# Patient Record
Sex: MALE | Race: WHITE | Employment: FULL TIME | URBAN - METROPOLITAN AREA
[De-identification: names, ages, dates, MRNs, and addresses within clinical notes are randomized per-mention and may not be internally consistent; named-entity substitution may affect disease eponyms.]

---

## 2017-09-19 ENCOUNTER — OFFICE VISIT (OUTPATIENT)
Dept: FAMILY MEDICINE CLINIC | Facility: CLINIC | Age: 52
End: 2017-09-19

## 2017-09-19 VITALS
WEIGHT: 177 LBS | BODY MASS INDEX: 25.34 KG/M2 | HEIGHT: 70 IN | SYSTOLIC BLOOD PRESSURE: 120 MMHG | OXYGEN SATURATION: 98 % | RESPIRATION RATE: 18 BRPM | TEMPERATURE: 98 F | DIASTOLIC BLOOD PRESSURE: 84 MMHG | HEART RATE: 76 BPM

## 2017-09-19 DIAGNOSIS — L82.1 SEBORRHEIC KERATOSIS: Primary | ICD-10-CM

## 2017-09-19 PROCEDURE — 99213 OFFICE O/P EST LOW 20 MIN: CPT | Performed by: FAMILY MEDICINE

## 2017-09-19 NOTE — PROGRESS NOTES
Here with wife for a slightly enlarged lesion left upper outer buttock no family history of skin cancers he himself has had no significant history. The lesion is asymptomatic measures three quarters of a centimeter lengthwise.   There is no local lymphaden

## 2021-08-05 ENCOUNTER — APPOINTMENT (OUTPATIENT)
Dept: CV DIAGNOSTICS | Facility: HOSPITAL | Age: 56
End: 2021-08-05
Attending: INTERNAL MEDICINE
Payer: COMMERCIAL

## 2021-08-05 ENCOUNTER — HOSPITAL ENCOUNTER (OUTPATIENT)
Facility: HOSPITAL | Age: 56
Setting detail: OBSERVATION
Discharge: HOME | End: 2021-08-06
Attending: EMERGENCY MEDICINE | Admitting: INTERNAL MEDICINE
Payer: COMMERCIAL

## 2021-08-05 ENCOUNTER — APPOINTMENT (OUTPATIENT)
Dept: CT IMAGING | Facility: HOSPITAL | Age: 56
End: 2021-08-05
Attending: EMERGENCY MEDICINE
Payer: COMMERCIAL

## 2021-08-05 ENCOUNTER — APPOINTMENT (OUTPATIENT)
Dept: INTERVENTIONAL RADIOLOGY/VASCULAR | Facility: HOSPITAL | Age: 56
End: 2021-08-05
Attending: INTERNAL MEDICINE
Payer: COMMERCIAL

## 2021-08-05 ENCOUNTER — APPOINTMENT (OUTPATIENT)
Dept: GENERAL RADIOLOGY | Facility: HOSPITAL | Age: 56
End: 2021-08-05
Attending: EMERGENCY MEDICINE
Payer: COMMERCIAL

## 2021-08-05 DIAGNOSIS — R07.9 ACUTE CHEST PAIN: Primary | ICD-10-CM

## 2021-08-05 LAB
ALBUMIN SERPL-MCNC: 3.6 G/DL (ref 3.4–5)
ALBUMIN/GLOB SERPL: 1 {RATIO} (ref 1–2)
ALP LIVER SERPL-CCNC: 89 U/L
ALT SERPL-CCNC: 28 U/L
ANION GAP SERPL CALC-SCNC: 3 MMOL/L (ref 0–18)
ANION GAP SERPL CALC-SCNC: 4 MMOL/L (ref 0–18)
APTT PPP: 44.5 SECONDS (ref 25.4–36.1)
AST SERPL-CCNC: 25 U/L (ref 15–37)
ATRIAL RATE: 56 BPM
ATRIAL RATE: 58 BPM
BASOPHILS # BLD AUTO: 0.05 X10(3) UL (ref 0–0.2)
BASOPHILS NFR BLD AUTO: 0.9 %
BILIRUB SERPL-MCNC: 0.8 MG/DL (ref 0.1–2)
BUN BLD-MCNC: 10 MG/DL (ref 7–18)
BUN BLD-MCNC: 13 MG/DL (ref 7–18)
CALCIUM BLD-MCNC: 8.2 MG/DL (ref 8.5–10.1)
CALCIUM BLD-MCNC: 8.7 MG/DL (ref 8.5–10.1)
CHLORIDE SERPL-SCNC: 108 MMOL/L (ref 98–112)
CHLORIDE SERPL-SCNC: 110 MMOL/L (ref 98–112)
CO2 SERPL-SCNC: 27 MMOL/L (ref 21–32)
CO2 SERPL-SCNC: 27 MMOL/L (ref 21–32)
CREAT BLD-MCNC: 0.83 MG/DL
CREAT BLD-MCNC: 1.15 MG/DL
D-DIMER: 0.52 UG/ML FEU (ref ?–0.55)
EOSINOPHIL # BLD AUTO: 0.24 X10(3) UL (ref 0–0.7)
EOSINOPHIL NFR BLD AUTO: 4.2 %
ERYTHROCYTE [DISTWIDTH] IN BLOOD BY AUTOMATED COUNT: 12.2 %
ERYTHROCYTE [DISTWIDTH] IN BLOOD BY AUTOMATED COUNT: 12.4 %
GLOBULIN PLAS-MCNC: 3.6 G/DL (ref 2.8–4.4)
GLUCOSE BLD-MCNC: 112 MG/DL (ref 70–99)
GLUCOSE BLD-MCNC: 99 MG/DL (ref 70–99)
HCT VFR BLD AUTO: 46 %
HCT VFR BLD AUTO: 52.4 %
HGB BLD-MCNC: 15.4 G/DL
HGB BLD-MCNC: 17 G/DL
IMM GRANULOCYTES # BLD AUTO: 0.01 X10(3) UL (ref 0–1)
IMM GRANULOCYTES NFR BLD: 0.2 %
LIPASE SERPL-CCNC: 758 U/L (ref 73–393)
LYMPHOCYTES # BLD AUTO: 2.69 X10(3) UL (ref 1–4)
LYMPHOCYTES NFR BLD AUTO: 47.5 %
M PROTEIN MFR SERPL ELPH: 7.2 G/DL (ref 6.4–8.2)
MCH RBC QN AUTO: 30.5 PG (ref 26–34)
MCH RBC QN AUTO: 30.9 PG (ref 26–34)
MCHC RBC AUTO-ENTMCNC: 32.4 G/DL (ref 31–37)
MCHC RBC AUTO-ENTMCNC: 33.5 G/DL (ref 31–37)
MCV RBC AUTO: 92.2 FL
MCV RBC AUTO: 93.9 FL
MONOCYTES # BLD AUTO: 0.54 X10(3) UL (ref 0.1–1)
MONOCYTES NFR BLD AUTO: 9.5 %
NEUTROPHILS # BLD AUTO: 2.13 X10 (3) UL (ref 1.5–7.7)
NEUTROPHILS # BLD AUTO: 2.13 X10(3) UL (ref 1.5–7.7)
NEUTROPHILS NFR BLD AUTO: 37.7 %
NT-PROBNP SERPL-MCNC: 61 PG/ML (ref ?–125)
OSMOLALITY SERPL CALC.SUM OF ELEC: 288 MOSM/KG (ref 275–295)
OSMOLALITY SERPL CALC.SUM OF ELEC: 290 MOSM/KG (ref 275–295)
P AXIS: 18 DEGREES
P AXIS: 39 DEGREES
P-R INTERVAL: 122 MS
P-R INTERVAL: 130 MS
PLATELET # BLD AUTO: 243 10(3)UL (ref 150–450)
PLATELET # BLD AUTO: 279 10(3)UL (ref 150–450)
POTASSIUM SERPL-SCNC: 3.7 MMOL/L (ref 3.5–5.1)
POTASSIUM SERPL-SCNC: 4 MMOL/L (ref 3.5–5.1)
PSA SERPL-MCNC: 0.48 NG/ML (ref ?–4)
Q-T INTERVAL: 404 MS
Q-T INTERVAL: 416 MS
QRS DURATION: 84 MS
QRS DURATION: 92 MS
QTC CALCULATION (BEZET): 396 MS
QTC CALCULATION (BEZET): 401 MS
R AXIS: 62 DEGREES
R AXIS: 65 DEGREES
RBC # BLD AUTO: 4.99 X10(6)UL
RBC # BLD AUTO: 5.58 X10(6)UL
SARS-COV-2 RNA RESP QL NAA+PROBE: NOT DETECTED
SODIUM SERPL-SCNC: 139 MMOL/L (ref 136–145)
SODIUM SERPL-SCNC: 140 MMOL/L (ref 136–145)
T AXIS: 47 DEGREES
T AXIS: 50 DEGREES
TROPONIN I SERPL-MCNC: 12.9 NG/ML (ref ?–0.04)
TROPONIN I SERPL-MCNC: 21.8 NG/ML (ref ?–0.04)
TROPONIN I SERPL-MCNC: <0.045 NG/ML (ref ?–0.04)
VENTRICULAR RATE: 56 BPM
VENTRICULAR RATE: 58 BPM
WBC # BLD AUTO: 5.7 X10(3) UL (ref 4–11)
WBC # BLD AUTO: 5.9 X10(3) UL (ref 4–11)

## 2021-08-05 PROCEDURE — 99285 EMERGENCY DEPT VISIT HI MDM: CPT

## 2021-08-05 PROCEDURE — 80053 COMPREHEN METABOLIC PANEL: CPT | Performed by: EMERGENCY MEDICINE

## 2021-08-05 PROCEDURE — B2111ZZ FLUOROSCOPY OF MULTIPLE CORONARY ARTERIES USING LOW OSMOLAR CONTRAST: ICD-10-PCS | Performed by: INTERNAL MEDICINE

## 2021-08-05 PROCEDURE — 85379 FIBRIN DEGRADATION QUANT: CPT | Performed by: EMERGENCY MEDICINE

## 2021-08-05 PROCEDURE — 4A023N7 MEASUREMENT OF CARDIAC SAMPLING AND PRESSURE, LEFT HEART, PERCUTANEOUS APPROACH: ICD-10-PCS | Performed by: INTERNAL MEDICINE

## 2021-08-05 PROCEDURE — 93010 ELECTROCARDIOGRAM REPORT: CPT

## 2021-08-05 PROCEDURE — 36415 COLL VENOUS BLD VENIPUNCTURE: CPT

## 2021-08-05 PROCEDURE — 86334 IMMUNOFIX E-PHORESIS SERUM: CPT | Performed by: HOSPITALIST

## 2021-08-05 PROCEDURE — 85730 THROMBOPLASTIN TIME PARTIAL: CPT | Performed by: INTERNAL MEDICINE

## 2021-08-05 PROCEDURE — 93306 TTE W/DOPPLER COMPLETE: CPT | Performed by: INTERNAL MEDICINE

## 2021-08-05 PROCEDURE — 84165 PROTEIN E-PHORESIS SERUM: CPT | Performed by: HOSPITALIST

## 2021-08-05 PROCEDURE — B2151ZZ FLUOROSCOPY OF LEFT HEART USING LOW OSMOLAR CONTRAST: ICD-10-PCS | Performed by: INTERNAL MEDICINE

## 2021-08-05 PROCEDURE — 85027 COMPLETE CBC AUTOMATED: CPT | Performed by: INTERNAL MEDICINE

## 2021-08-05 PROCEDURE — 93010 ELECTROCARDIOGRAM REPORT: CPT | Performed by: INTERNAL MEDICINE

## 2021-08-05 PROCEDURE — 71275 CT ANGIOGRAPHY CHEST: CPT | Performed by: EMERGENCY MEDICINE

## 2021-08-05 PROCEDURE — 84484 ASSAY OF TROPONIN QUANT: CPT | Performed by: NURSE PRACTITIONER

## 2021-08-05 PROCEDURE — 99152 MOD SED SAME PHYS/QHP 5/>YRS: CPT

## 2021-08-05 PROCEDURE — 84484 ASSAY OF TROPONIN QUANT: CPT | Performed by: EMERGENCY MEDICINE

## 2021-08-05 PROCEDURE — 83690 ASSAY OF LIPASE: CPT | Performed by: EMERGENCY MEDICINE

## 2021-08-05 PROCEDURE — 83883 ASSAY NEPHELOMETRY NOT SPEC: CPT | Performed by: HOSPITALIST

## 2021-08-05 PROCEDURE — 84153 ASSAY OF PSA TOTAL: CPT | Performed by: HOSPITALIST

## 2021-08-05 PROCEDURE — 93458 L HRT ARTERY/VENTRICLE ANGIO: CPT

## 2021-08-05 PROCEDURE — 93005 ELECTROCARDIOGRAM TRACING: CPT

## 2021-08-05 PROCEDURE — 71045 X-RAY EXAM CHEST 1 VIEW: CPT | Performed by: EMERGENCY MEDICINE

## 2021-08-05 PROCEDURE — 74177 CT ABD & PELVIS W/CONTRAST: CPT | Performed by: EMERGENCY MEDICINE

## 2021-08-05 PROCEDURE — 85025 COMPLETE CBC W/AUTO DIFF WBC: CPT | Performed by: EMERGENCY MEDICINE

## 2021-08-05 PROCEDURE — 83880 ASSAY OF NATRIURETIC PEPTIDE: CPT | Performed by: EMERGENCY MEDICINE

## 2021-08-05 PROCEDURE — 99153 MOD SED SAME PHYS/QHP EA: CPT

## 2021-08-05 PROCEDURE — 84484 ASSAY OF TROPONIN QUANT: CPT | Performed by: HOSPITALIST

## 2021-08-05 RX ORDER — HEPARIN SODIUM AND DEXTROSE 10000; 5 [USP'U]/100ML; G/100ML
12 INJECTION INTRAVENOUS ONCE
Status: COMPLETED | OUTPATIENT
Start: 2021-08-05 | End: 2021-08-06

## 2021-08-05 RX ORDER — MIDAZOLAM HYDROCHLORIDE 1 MG/ML
INJECTION INTRAMUSCULAR; INTRAVENOUS
Status: COMPLETED
Start: 2021-08-05 | End: 2021-08-05

## 2021-08-05 RX ORDER — ONDANSETRON 2 MG/ML
4 INJECTION INTRAMUSCULAR; INTRAVENOUS EVERY 6 HOURS PRN
Status: DISCONTINUED | OUTPATIENT
Start: 2021-08-05 | End: 2021-08-06

## 2021-08-05 RX ORDER — NITROGLYCERIN 0.4 MG/1
0.4 TABLET SUBLINGUAL EVERY 5 MIN PRN
Status: DISCONTINUED | OUTPATIENT
Start: 2021-08-05 | End: 2021-08-06

## 2021-08-05 RX ORDER — HEPARIN SODIUM 5000 [USP'U]/ML
60 INJECTION INTRAVENOUS; SUBCUTANEOUS ONCE
Status: COMPLETED | OUTPATIENT
Start: 2021-08-05 | End: 2021-08-05

## 2021-08-05 RX ORDER — HEPARIN SODIUM AND DEXTROSE 10000; 5 [USP'U]/100ML; G/100ML
INJECTION INTRAVENOUS CONTINUOUS
Status: DISCONTINUED | OUTPATIENT
Start: 2021-08-05 | End: 2021-08-06

## 2021-08-05 RX ORDER — MAGNESIUM HYDROXIDE/ALUMINUM HYDROXICE/SIMETHICONE 120; 1200; 1200 MG/30ML; MG/30ML; MG/30ML
30 SUSPENSION ORAL 4 TIMES DAILY PRN
Status: DISCONTINUED | OUTPATIENT
Start: 2021-08-05 | End: 2021-08-06

## 2021-08-05 RX ORDER — PANTOPRAZOLE SODIUM 40 MG/1
40 TABLET, DELAYED RELEASE ORAL
Status: DISCONTINUED | OUTPATIENT
Start: 2021-08-06 | End: 2021-08-06

## 2021-08-05 RX ORDER — VERAPAMIL HYDROCHLORIDE 2.5 MG/ML
INJECTION, SOLUTION INTRAVENOUS
Status: COMPLETED
Start: 2021-08-05 | End: 2021-08-05

## 2021-08-05 RX ORDER — PANTOPRAZOLE SODIUM 40 MG/1
40 TABLET, DELAYED RELEASE ORAL DAILY
Qty: 30 TABLET | Refills: 0 | Status: SHIPPED | OUTPATIENT
Start: 2021-08-05 | End: 2021-08-06

## 2021-08-05 RX ORDER — NITROGLYCERIN 20 MG/100ML
INJECTION INTRAVENOUS
Status: COMPLETED
Start: 2021-08-05 | End: 2021-08-05

## 2021-08-05 RX ORDER — CALCIUM CARBONATE 200(500)MG
TABLET,CHEWABLE ORAL 3 TIMES DAILY PRN
Status: DISCONTINUED | OUTPATIENT
Start: 2021-08-05 | End: 2021-08-06

## 2021-08-05 RX ORDER — METOPROLOL SUCCINATE 50 MG/1
50 TABLET, EXTENDED RELEASE ORAL
Status: COMPLETED | OUTPATIENT
Start: 2021-08-05 | End: 2021-08-05

## 2021-08-05 RX ORDER — ASPIRIN 81 MG/1
324 TABLET, CHEWABLE ORAL ONCE
Status: COMPLETED | OUTPATIENT
Start: 2021-08-05 | End: 2021-08-05

## 2021-08-05 RX ORDER — SODIUM CHLORIDE 9 MG/ML
INJECTION, SOLUTION INTRAVENOUS CONTINUOUS
Status: DISCONTINUED | OUTPATIENT
Start: 2021-08-05 | End: 2021-08-06

## 2021-08-05 RX ORDER — LIDOCAINE HYDROCHLORIDE 10 MG/ML
INJECTION, SOLUTION EPIDURAL; INFILTRATION; INTRACAUDAL; PERINEURAL
Status: COMPLETED
Start: 2021-08-05 | End: 2021-08-05

## 2021-08-05 RX ORDER — HEPARIN SODIUM 5000 [USP'U]/ML
INJECTION, SOLUTION INTRAVENOUS; SUBCUTANEOUS
Status: COMPLETED
Start: 2021-08-05 | End: 2021-08-05

## 2021-08-05 NOTE — H&P
REG Hospitalist H&P       CC: Patient presents with:  Chest Pain Angina       PCP: Anika Lopez MD    History of Present Illness:  Patient is a 54year old male with PMH sig for GERD presented with CP/pressure with radiation up to jaw.  At time of my 1. 15   CA 8.7   GLU 99       Recent Labs   Lab 08/05/21  0651   ALT 28   AST 25   ALB 3.6       Recent Labs   Lab 08/05/21  0651   TROP <0.045   PBNP 61       Recent Labs   Lab 08/05/21  0713   DDIMER 0.52       Radiology: XR CHEST AP PORTABLE  (CPT=71023) ACCESS/CATHETER PLACEMENT:   The right radial area was prepped and draped in a sterile manner and anesthetized with 2% lidocaine. The right radial artery was accessed, and a 6-Vincentian, 11 cm sheath was placed.   Left and right selective coronary angiography LCx: mild luminal irregularities - RCA: no significant angiographic disease  RECOMMENDATIONS: No angiographic indication for coronary revascularization. Non-cardiac chest pain. No further cardiac testing recommendations.      CTA CHEST + CT ABD (W) + CT P acute process. No excessive stool in the colon, sign of colitis, diverticulitis, bowel obstruction, free air, ascites, mesenteric adenopathy, mesenteric edema. ABDOMINAL WALL:  Unremarkable. URINARY BLADDER:  Unremarkable. PELVIC NODES:  Unremarkable. wnl  - PSA added on - wnl  - SPEP sent   - heme/onc consultation appreciated     Prophy: SCDs    Dispo: admit, going to cath lab    Niels Kayser, MD  [facility] IM Hospitalist  Pager: [phone]      ADDENDUM  -s/p cath - no signficant CAD noted. EF ok.  Ok to d

## 2021-08-05 NOTE — ED QUICK NOTES
Raegan Araujo ([id number]) listed as RN on CTU 2. Transport paged  Did not receive metoprolol succ from pharmacy at this time.

## 2021-08-05 NOTE — ED QUICK NOTES
Called pharmacy to send Metoprolol to A pod tube station    Called CTU2 Charge RN, no nurse name or number listed.

## 2021-08-05 NOTE — PROCEDURES
Benita Salazar Location: Cath Lab    [facility] [id number] MRN [id number]   Admission Date 8/5/2021 Procedure Date 8/5/2021   Attending Physician Silver Sharma MD Procedure Physician Jaxson Farley MD         CARDIAC CATHETERIZATION pullback of the catheter. 2.  Selective coronary angiography:      Left main artery: The left main artery is a medium caliber, bifurcating vessel without significant angiographic disease.       LAD:  The left anterior descending artery is a medium size v

## 2021-08-05 NOTE — PROGRESS NOTES
Pt return from cath lab, Right wrist T-R band in place 10cc of air, no bleeding or hematoma noted, pt BP in the upper 80's, cardiology aware, orders to bolus pt,     This RN was notified of critical troponin 12.9, cardiology notified.  Order to have troponi

## 2021-08-05 NOTE — ED INITIAL ASSESSMENT (HPI)
Patient here with c/o chest pain that started this morning while lying awake in bed. Pain goes across chest and radiates up neck, aching/stabbing in nature 6/10. Denies SOB.

## 2021-08-05 NOTE — CONSULTS
[facility] Cardiology  Consultation Note      Deette Suly Patient Status:  Emergency    10/28/1965 MRN [id number]   Location [address] [facility] Attending Delmer Haque, [facility] Day # 0 PCP Kourtney Hardy MD     Reason standpoint.     **ADDENDUM**  - Repeat trop 12.9, #2 repeat 21.    - Re-reviewed angiogram films to confirm absence of coronary plaque rupture.  - DDx includes marlene-pericardial process (but no ECG changes?), coronary vasospasm  - TTE, cMRI in AM  - heparin g for myalgias  Neurological: negative for dizziness and headaches  Endocrine: negative for temperature intolerance      Physical Exam:  Blood pressure 129/90, pulse 62, temperature 97.9 °F (36.6 °C), temperature source Temporal, resp.  rate 16, height 5' 10\ questions.     Juliet Thompson MD  8/5/2021  10:41 AM

## 2021-08-05 NOTE — ED QUICK NOTES
Orders for admission, patient is aware of plan and ready to go upstairs. Any questions, please call ED RN [phone]    Vaccinated?   Type of COVID test sent: rapid, negative  COVID Suspicion level:       Titratable drug(s) infusing:na  Rate:    LOC at time of t

## 2021-08-05 NOTE — PLAN OF CARE
Pt is alert x 4, on Ra, NSR on the monitor. Pt reports chest pressure 4/10 on arrival, Pt is up with SBA, continent of B/B. All needs met and will continue to monitor. PLAN; Cath today.        POC: Discussed and updated with pt, pt verbalized Marguerite Rossi ordered  - Initiate emergency measures for life threatening arrhythmias  - Monitor electrolytes and administer replacement therapy as ordered  Outcome: Progressing

## 2021-08-05 NOTE — ED PROVIDER NOTES
Patient Seen in: [facility] Emergency Department      History   Patient presents with:  Chest Pain Angina    Stated Complaint: Chest pain    HPI/Subjective:   HPI    14-year-old male presents emergency department with chief complaint of chest discom 98%   BMI 23.68 kg/m²         Physical Exam    GENERAL: Patient is awake, alert, well-appearing, in no acute distress. HEENT:  no scleral icterus. Mucous membranes are moist.  Scalp is atraumatic. NECK: Neck is supple, there is no nuchal rigidity.     HE angina/heart disease, pulmonary embolism, dissection, pneumothorax, pneumonia, pancreatitis, cholecystitis/cholelithiasis, and other  MDM      Patient IV established, placed on cardiac monitor and pulse ox. Patient was given aspirin.   Chest x-ray performe

## 2021-08-06 ENCOUNTER — APPOINTMENT (OUTPATIENT)
Dept: MRI IMAGING | Facility: HOSPITAL | Age: 56
End: 2021-08-06
Attending: INTERNAL MEDICINE
Payer: COMMERCIAL

## 2021-08-06 VITALS
HEIGHT: 70 IN | DIASTOLIC BLOOD PRESSURE: 72 MMHG | TEMPERATURE: 99 F | WEIGHT: 162.5 LBS | BODY MASS INDEX: 23.26 KG/M2 | SYSTOLIC BLOOD PRESSURE: 109 MMHG | RESPIRATION RATE: 18 BRPM | HEART RATE: 56 BPM | OXYGEN SATURATION: 96 %

## 2021-08-06 LAB
ANION GAP SERPL CALC-SCNC: 3 MMOL/L (ref 0–18)
APTT PPP: 111.9 SECONDS (ref 25.4–36.1)
APTT PPP: 51.1 SECONDS (ref 25.4–36.1)
ATRIAL RATE: 61 BPM
BASOPHILS # BLD AUTO: 0.05 X10(3) UL (ref 0–0.2)
BASOPHILS NFR BLD AUTO: 0.8 %
BUN BLD-MCNC: 10 MG/DL (ref 7–18)
CALCIUM BLD-MCNC: 8.2 MG/DL (ref 8.5–10.1)
CHLORIDE SERPL-SCNC: 112 MMOL/L (ref 98–112)
CO2 SERPL-SCNC: 26 MMOL/L (ref 21–32)
CREAT BLD-MCNC: 0.98 MG/DL
EOSINOPHIL # BLD AUTO: 0.21 X10(3) UL (ref 0–0.7)
EOSINOPHIL NFR BLD AUTO: 3.4 %
ERYTHROCYTE [DISTWIDTH] IN BLOOD BY AUTOMATED COUNT: 12.6 %
GLUCOSE BLD-MCNC: 90 MG/DL (ref 70–99)
HCT VFR BLD AUTO: 44.5 %
HGB BLD-MCNC: 14.9 G/DL
IMM GRANULOCYTES # BLD AUTO: 0.01 X10(3) UL (ref 0–1)
IMM GRANULOCYTES NFR BLD: 0.2 %
LIPASE SERPL-CCNC: 237 U/L (ref 73–393)
LYMPHOCYTES # BLD AUTO: 1.99 X10(3) UL (ref 1–4)
LYMPHOCYTES NFR BLD AUTO: 32.6 %
MCH RBC QN AUTO: 30.7 PG (ref 26–34)
MCHC RBC AUTO-ENTMCNC: 33.5 G/DL (ref 31–37)
MCV RBC AUTO: 91.8 FL
MONOCYTES # BLD AUTO: 0.52 X10(3) UL (ref 0.1–1)
MONOCYTES NFR BLD AUTO: 8.5 %
NEUTROPHILS # BLD AUTO: 3.32 X10 (3) UL (ref 1.5–7.7)
NEUTROPHILS # BLD AUTO: 3.32 X10(3) UL (ref 1.5–7.7)
NEUTROPHILS NFR BLD AUTO: 54.5 %
OSMOLALITY SERPL CALC.SUM OF ELEC: 291 MOSM/KG (ref 275–295)
P AXIS: 54 DEGREES
P-R INTERVAL: 136 MS
PLATELET # BLD AUTO: 226 10(3)UL (ref 150–450)
PLATELET # BLD AUTO: 226 10(3)UL (ref 150–450)
POTASSIUM SERPL-SCNC: 3.9 MMOL/L (ref 3.5–5.1)
Q-T INTERVAL: 398 MS
QRS DURATION: 84 MS
QTC CALCULATION (BEZET): 400 MS
R AXIS: 73 DEGREES
RBC # BLD AUTO: 4.85 X10(6)UL
SODIUM SERPL-SCNC: 141 MMOL/L (ref 136–145)
T AXIS: 76 DEGREES
TROPONIN I SERPL-MCNC: 7.21 NG/ML (ref ?–0.04)
VENTRICULAR RATE: 61 BPM
WBC # BLD AUTO: 6.1 X10(3) UL (ref 4–11)

## 2021-08-06 PROCEDURE — A9575 INJ GADOTERATE MEGLUMI 0.1ML: HCPCS | Performed by: HOSPITALIST

## 2021-08-06 PROCEDURE — 84484 ASSAY OF TROPONIN QUANT: CPT | Performed by: INTERNAL MEDICINE

## 2021-08-06 PROCEDURE — 80048 BASIC METABOLIC PNL TOTAL CA: CPT | Performed by: HOSPITALIST

## 2021-08-06 PROCEDURE — 85049 AUTOMATED PLATELET COUNT: CPT | Performed by: INTERNAL MEDICINE

## 2021-08-06 PROCEDURE — 85730 THROMBOPLASTIN TIME PARTIAL: CPT | Performed by: INTERNAL MEDICINE

## 2021-08-06 PROCEDURE — 85025 COMPLETE CBC W/AUTO DIFF WBC: CPT | Performed by: HOSPITALIST

## 2021-08-06 PROCEDURE — 75561 CARDIAC MRI FOR MORPH W/DYE: CPT | Performed by: INTERNAL MEDICINE

## 2021-08-06 PROCEDURE — 83690 ASSAY OF LIPASE: CPT | Performed by: HOSPITALIST

## 2021-08-06 RX ORDER — NITROGLYCERIN 0.4 MG/1
0.4 TABLET SUBLINGUAL EVERY 5 MIN PRN
Qty: 30 TABLET | Refills: 2 | Status: SHIPPED | OUTPATIENT
Start: 2021-08-06 | End: 2021-09-21 | Stop reason: ALTCHOICE

## 2021-08-06 RX ORDER — ROSUVASTATIN CALCIUM 20 MG/1
20 TABLET, COATED ORAL NIGHTLY
Status: DISCONTINUED | OUTPATIENT
Start: 2021-08-06 | End: 2021-08-06

## 2021-08-06 RX ORDER — ROSUVASTATIN CALCIUM 20 MG/1
20 TABLET, COATED ORAL NIGHTLY
Qty: 90 TABLET | Refills: 3 | Status: SHIPPED | OUTPATIENT
Start: 2021-08-06 | End: 2021-09-21 | Stop reason: ALTCHOICE

## 2021-08-06 RX ORDER — ASPIRIN 81 MG/1
81 TABLET, CHEWABLE ORAL DAILY
Qty: 90 TABLET | Refills: 3 | Status: SHIPPED | OUTPATIENT
Start: 2021-08-07 | End: 2022-08-02

## 2021-08-06 RX ORDER — METOPROLOL SUCCINATE 25 MG/1
12.5 TABLET, EXTENDED RELEASE ORAL
Qty: 45 TABLET | Refills: 3 | Status: SHIPPED | OUTPATIENT
Start: 2021-08-07 | End: 2022-08-02

## 2021-08-06 RX ORDER — ASPIRIN 81 MG/1
81 TABLET, CHEWABLE ORAL DAILY
Status: DISCONTINUED | OUTPATIENT
Start: 2021-08-07 | End: 2021-08-06

## 2021-08-06 NOTE — DISCHARGE SUMMARY
General Medicine Discharge Summary     Patient ID:  Pérez Parkinson  54year old  10/28/1965    Admit date: 8/5/2021    Discharge date and time: 8/6/2021  4:12 PM     Attending Physician: No att. provide elevated               - hep gtt started, BB               - cardiac MRI with acute lateral wall infarct   - discharge with event monitor, asa, BB, statin   - f/u with cardiology in 2-4 wks     # incidentally noted lucencies on bone as well as on liver  - increased T2 signal in this region. A tiny focus of hypointensity (series  22, image 5) within the subendocardial region may represent very subtle microvascular obstruction.   In combination with elevated cardiac markers this most likely represents an area ---------------------------------------------------------------------------- History/Indications:  NSTEMI ---------------------------------------------------------------------------- Procedure information:  A transthoracic complete 2D study was performed. Doppler:  Transvalvular velocity was within the normal range. There was no evidence for stenosis. There was no regurgitation. Pulmonic valve:   Not well visualized. Structurally normal valve.    Cusp separation was normal.  Doppler:  Transvalvular velocity 3.0 - 4.0  LA ID/bsa, A-P                  (L)     1.2   cm/m^2 1. 5 - 2.3  LA volume, ES, 1-p A4C                  21    ml     18 - 58  LA volume/bsa, ES, 1-p A4C              11    ml/m^2 ---------   Systemic veins                          Value directly observed the patient from 1238 to [id number], for a total of 29 minutes, and an independent trained observer was present and assisted in the monitoring of the patient's level of consciousness and physiological status, watching the heart rate, blood pres observed during this visit to the catheterization lab. IMPRESSION:  1. Left heart catheterization: LVEDP 7 mmHg, no aortic stenosis. LVEF 60-65% with normal wall motion, no mitral regurgitation 2.   Coronary angiography:  right dominant system - LM: no s characterize, with any attenuation value determinations influenced by volume averaging artifact. BILIARY:  Unremarkable. PANCREAS:  Unremarkable. SPLEEN:  Unremarkable. KIDNEYS:  No acute abnormality. ADRENALS:  Unremarkable.   AORTA/VASCULAR:  No aort Operative Procedures:        Patient instructions:      Discharge Medication List as of 8/6/2021  3:16 PM    START taking these medications    aspirin 81 MG Oral Chew Tab  Chew 1 tablet (81 mg total) by mouth daily. , Normal, Disp-90 tablet, R-3    m

## 2021-08-06 NOTE — PROGRESS NOTES
[facility] Hospitalist Progress Note     Chris Sink Patient Status:  Observation    10/28/1965 MRN [id number]   [facility] 2NE-A Attending Letty Amor MD   Hosp Day # 0 PCP Lord Nabor MD     CC: follow up    SUBJECTIVE:  No furt visit on 08/05/21.     Lab Results   Component Value Date    COVID19 Not Detected 08/05/2021          Assessment/Plan:     # chest pain  - apprec cardiology recs - negative for occlusive disease however trop elevated   - hep gtt started, BB   - cardiac MRI

## 2021-08-06 NOTE — PLAN OF CARE
Pt is alert x 4, on Ra, NSR/SB on the monitor. PT denies any cardiovascular symptoms at this time. Pt is up with SBA, continent of B/B. All needs met and will continue to monitor. Troponin this AM 7.210, cardiology aware.      Heparin gtt,  IV fluids baseline  Description: INTERVENTIONS:  - Continuous cardiac monitoring, monitor vital signs, obtain 12 lead EKG if indicated  - Evaluate effectiveness of antiarrhythmic and heart rate control medications as ordered  - Initiate emergency measures for life t

## 2021-08-06 NOTE — CONSULTS
[facility]  Report of Consultation    Lb Lake Patient Status:  Observation    10/28/1965 MRN [id number]   [facility] 2NE-A Attending Leidy Cisneros MD   Hosp Day # 0 PCP Mattie Ryan MD     ADMIT DATE AND TIME:  chewable tab 500-1,000 mg, 500-1,000 mg, Oral, TID PRN  •  heparin (PORCINE) drip [id number]/250mL infusion CONTINUOUS, 200-3,000 Units/hr, Intravenous, Continuous  •  nitroGLYCERIN (NITROSTAT) SL tab 0.4 mg, 0.4 mg, Sublingual, Q5 Min PRN  •  metoprolol t Axis 47 degrees   Rapid SARS-CoV-2 by PCR    Collection Time: 08/05/21  9:20 AM    Specimen: Nares;  Other   Result Value Ref Range    Rapid SARS-CoV-2 by PCR Not Detected Not Detected   Troponin I    Collection Time: 08/05/21 12:11 PM   Result Value Ref Ra 12.4   NEPRELIM 2.13  --    WBC 5.7 5.9   .0 243.0         Component      Latest Ref Rng & Units 8/5/2021   D-Dimer      <0.55 ug/mL FEU 0.52   PSA      <=4.00 ng/mL 0.48       Imaging:    XR CHEST AP PORTABLE  (CPT=71045)    Result Date: 8/5/2021 pending   They may or may not be neoplastic - difficult to differentiate   Plan outpatient follow up and may consider PET / biopsy as needed     3.  Liver lesions - too small for any clinical significance   Need follow up    Reviewed with the patient and wi

## 2021-08-06 NOTE — PLAN OF CARE
Assumed care of pt. At [address]. Pt resting in bed, no complaints at this time. Spouse sitting at bedside and plans to stay overnight. Pleasant and cooperative with staff. Updated on plan of care. Call light within reach, pt. Able to use. Aox4.  Wears glass RN  Outcome: Progressing  Goal: Absence of cardiac arrhythmias or at baseline  Description: INTERVENTIONS:  - Continuous cardiac monitoring, monitor vital signs, obtain 12 lead EKG if indicated  - Evaluate effectiveness of antiarrhythmic and heart rate con

## 2021-08-10 LAB
ALBUMIN SERPL ELPH-MCNC: 4.43 G/DL (ref 3.75–5.21)
ALBUMIN/GLOB SERPL: 1.6 {RATIO} (ref 1–2)
ALPHA1 GLOB SERPL ELPH-MCNC: 0.28 G/DL (ref 0.19–0.46)
ALPHA2 GLOB SERPL ELPH-MCNC: 0.82 G/DL (ref 0.48–1.05)
B-GLOBULIN SERPL ELPH-MCNC: 0.86 G/DL (ref 0.68–1.23)
GAMMA GLOB SERPL ELPH-MCNC: 0.81 G/DL (ref 0.62–1.7)
KAPPA LC FREE SER-MCNC: 1.68 MG/DL (ref 0.33–1.94)
KAPPA LC FREE/LAMBDA FREE SER NEPH: 1.27 {RATIO} (ref 0.26–1.65)
LAMBDA LC FREE SERPL-MCNC: 1.32 MG/DL (ref 0.57–2.63)
M PROTEIN MFR SERPL ELPH: 7.2 G/DL (ref 6.4–8.2)

## 2021-09-03 PROBLEM — E78.5 HYPERLIPIDEMIA: Status: ACTIVE | Noted: 2021-09-03

## 2021-09-03 PROBLEM — I25.119 CORONARY ARTERY DISEASE INVOLVING NATIVE CORONARY ARTERY OF NATIVE HEART WITH ANGINA PECTORIS (HCC): Status: ACTIVE | Noted: 2021-09-03

## 2021-09-03 PROBLEM — I21.4 NSTEMI (NON-ST ELEVATED MYOCARDIAL INFARCTION) (HCC): Status: ACTIVE | Noted: 2021-09-03

## (undated) NOTE — LETTER
[facility] [address], [address]  Consent for Procedure/Sedation    Date: [id number]    Time: 6371      0.  I authorize the performance upon Eric Pearce the following:cardiac catheterization, left ventricular cineangiography, Signature of person authorized                                     Relationship to  to consent for patient: _________________________ patient: ___________________    Witness: _______________________________ Date: _____________________    Printed: 8/5/2021